# Patient Record
Sex: FEMALE | Race: WHITE | Employment: OTHER | URBAN - METROPOLITAN AREA
[De-identification: names, ages, dates, MRNs, and addresses within clinical notes are randomized per-mention and may not be internally consistent; named-entity substitution may affect disease eponyms.]

---

## 2021-08-11 ENCOUNTER — HOSPITAL ENCOUNTER (INPATIENT)
Age: 61
LOS: 1 days | Discharge: HOME | DRG: 641 | End: 2021-08-13
Attending: EMERGENCY MEDICINE | Admitting: INTERNAL MEDICINE
Payer: COMMERCIAL

## 2021-08-11 ENCOUNTER — APPOINTMENT (OUTPATIENT)
Dept: CT IMAGING | Age: 61
DRG: 641 | End: 2021-08-11
Payer: COMMERCIAL

## 2021-08-11 DIAGNOSIS — E87.1 HYPONATREMIA: Primary | ICD-10-CM

## 2021-08-11 DIAGNOSIS — R51.9 NONINTRACTABLE HEADACHE, UNSPECIFIED CHRONICITY PATTERN, UNSPECIFIED HEADACHE TYPE: ICD-10-CM

## 2021-08-11 LAB
BASOPHILS ABSOLUTE: 0 K/UL (ref 0–0.2)
BASOPHILS RELATIVE PERCENT: 0.5 %
EOSINOPHILS ABSOLUTE: 0 K/UL (ref 0–0.6)
EOSINOPHILS RELATIVE PERCENT: 0.2 %
HCT VFR BLD CALC: 41.9 % (ref 36–48)
HEMOGLOBIN: 14.7 G/DL (ref 12–16)
LYMPHOCYTES ABSOLUTE: 1.2 K/UL (ref 1–5.1)
LYMPHOCYTES RELATIVE PERCENT: 11.7 %
MCH RBC QN AUTO: 32 PG (ref 26–34)
MCHC RBC AUTO-ENTMCNC: 35 G/DL (ref 31–36)
MCV RBC AUTO: 91.3 FL (ref 80–100)
MONOCYTES ABSOLUTE: 0.3 K/UL (ref 0–1.3)
MONOCYTES RELATIVE PERCENT: 3 %
NEUTROPHILS ABSOLUTE: 8.4 K/UL (ref 1.7–7.7)
NEUTROPHILS RELATIVE PERCENT: 84.6 %
PDW BLD-RTO: 13.3 % (ref 12.4–15.4)
PLATELET # BLD: 290 K/UL (ref 135–450)
PMV BLD AUTO: 7.6 FL (ref 5–10.5)
RBC # BLD: 4.59 M/UL (ref 4–5.2)
TROPONIN: <0.01 NG/ML
WBC # BLD: 9.9 K/UL (ref 4–11)

## 2021-08-11 PROCEDURE — 6370000000 HC RX 637 (ALT 250 FOR IP): Performed by: EMERGENCY MEDICINE

## 2021-08-11 PROCEDURE — 36415 COLL VENOUS BLD VENIPUNCTURE: CPT

## 2021-08-11 PROCEDURE — 6360000002 HC RX W HCPCS

## 2021-08-11 PROCEDURE — 84484 ASSAY OF TROPONIN QUANT: CPT

## 2021-08-11 PROCEDURE — 96375 TX/PRO/DX INJ NEW DRUG ADDON: CPT

## 2021-08-11 PROCEDURE — 6360000002 HC RX W HCPCS: Performed by: EMERGENCY MEDICINE

## 2021-08-11 PROCEDURE — 99285 EMERGENCY DEPT VISIT HI MDM: CPT

## 2021-08-11 PROCEDURE — 96374 THER/PROPH/DIAG INJ IV PUSH: CPT

## 2021-08-11 PROCEDURE — 2580000003 HC RX 258: Performed by: EMERGENCY MEDICINE

## 2021-08-11 PROCEDURE — 85025 COMPLETE CBC W/AUTO DIFF WBC: CPT

## 2021-08-11 PROCEDURE — 80053 COMPREHEN METABOLIC PANEL: CPT

## 2021-08-11 PROCEDURE — 83735 ASSAY OF MAGNESIUM: CPT

## 2021-08-11 PROCEDURE — 70450 CT HEAD/BRAIN W/O DYE: CPT

## 2021-08-11 RX ORDER — PROCHLORPERAZINE EDISYLATE 5 MG/ML
10 INJECTION INTRAMUSCULAR; INTRAVENOUS ONCE
Status: COMPLETED | OUTPATIENT
Start: 2021-08-11 | End: 2021-08-11

## 2021-08-11 RX ORDER — 0.9 % SODIUM CHLORIDE 0.9 %
1000 INTRAVENOUS SOLUTION INTRAVENOUS ONCE
Status: COMPLETED | OUTPATIENT
Start: 2021-08-11 | End: 2021-08-12

## 2021-08-11 RX ORDER — ONDANSETRON 2 MG/ML
INJECTION INTRAMUSCULAR; INTRAVENOUS
Status: COMPLETED
Start: 2021-08-11 | End: 2021-08-11

## 2021-08-11 RX ORDER — ACETAMINOPHEN 325 MG/1
650 TABLET ORAL ONCE
Status: COMPLETED | OUTPATIENT
Start: 2021-08-11 | End: 2021-08-11

## 2021-08-11 RX ORDER — DIPHENHYDRAMINE HYDROCHLORIDE 50 MG/ML
25 INJECTION INTRAMUSCULAR; INTRAVENOUS ONCE
Status: COMPLETED | OUTPATIENT
Start: 2021-08-11 | End: 2021-08-11

## 2021-08-11 RX ADMIN — SODIUM CHLORIDE 1000 ML: 9 INJECTION, SOLUTION INTRAVENOUS at 23:29

## 2021-08-11 RX ADMIN — PROCHLORPERAZINE EDISYLATE 10 MG: 5 INJECTION INTRAMUSCULAR; INTRAVENOUS at 23:29

## 2021-08-11 RX ADMIN — DIPHENHYDRAMINE HYDROCHLORIDE 25 MG: 50 INJECTION, SOLUTION INTRAMUSCULAR; INTRAVENOUS at 23:29

## 2021-08-11 RX ADMIN — ACETAMINOPHEN 650 MG: 325 TABLET ORAL at 23:28

## 2021-08-11 RX ADMIN — ONDANSETRON HYDROCHLORIDE 4 MG: 2 INJECTION, SOLUTION INTRAMUSCULAR; INTRAVENOUS at 22:38

## 2021-08-11 ASSESSMENT — PAIN SCALES - GENERAL
PAINLEVEL_OUTOF10: 10
PAINLEVEL_OUTOF10: 10

## 2021-08-11 ASSESSMENT — PAIN DESCRIPTION - DESCRIPTORS: DESCRIPTORS: ACHING

## 2021-08-11 ASSESSMENT — PAIN DESCRIPTION - LOCATION: LOCATION: HEAD

## 2021-08-11 ASSESSMENT — PAIN DESCRIPTION - PAIN TYPE: TYPE: ACUTE PAIN

## 2021-08-11 NOTE — Clinical Note
Patient Class: Inpatient [101]   REQUIRED: Diagnosis: Hyponatremia [836220]   Estimated Length of Stay: Estimated stay of more than 2 midnights   Telemetry/Cardiac Monitoring Required?: Yes

## 2021-08-12 PROBLEM — E87.1 HYPONATREMIA: Status: ACTIVE | Noted: 2021-08-12

## 2021-08-12 LAB
A/G RATIO: 2.2 (ref 1.1–2.2)
ALBUMIN SERPL-MCNC: 4.5 G/DL (ref 3.4–5)
ALBUMIN SERPL-MCNC: 4.7 G/DL (ref 3.4–5)
ALBUMIN SERPL-MCNC: 4.7 G/DL (ref 3.4–5)
ALBUMIN SERPL-MCNC: 4.8 G/DL (ref 3.4–5)
ALBUMIN SERPL-MCNC: 4.9 G/DL (ref 3.4–5)
ALP BLD-CCNC: 93 U/L (ref 40–129)
ALT SERPL-CCNC: 16 U/L (ref 10–40)
ANION GAP SERPL CALCULATED.3IONS-SCNC: 13 MMOL/L (ref 3–16)
ANION GAP SERPL CALCULATED.3IONS-SCNC: 13 MMOL/L (ref 3–16)
ANION GAP SERPL CALCULATED.3IONS-SCNC: 15 MMOL/L (ref 3–16)
ANION GAP SERPL CALCULATED.3IONS-SCNC: 15 MMOL/L (ref 3–16)
ANION GAP SERPL CALCULATED.3IONS-SCNC: 16 MMOL/L (ref 3–16)
AST SERPL-CCNC: 23 U/L (ref 15–37)
BILIRUB SERPL-MCNC: 0.6 MG/DL (ref 0–1)
BILIRUBIN URINE: NEGATIVE
BLOOD, URINE: ABNORMAL
BUN BLDV-MCNC: 3 MG/DL (ref 7–20)
BUN BLDV-MCNC: 4 MG/DL (ref 7–20)
BUN BLDV-MCNC: 4 MG/DL (ref 7–20)
BUN BLDV-MCNC: 5 MG/DL (ref 7–20)
BUN BLDV-MCNC: 5 MG/DL (ref 7–20)
CALCIUM SERPL-MCNC: 8.6 MG/DL (ref 8.3–10.6)
CALCIUM SERPL-MCNC: 8.7 MG/DL (ref 8.3–10.6)
CALCIUM SERPL-MCNC: 8.7 MG/DL (ref 8.3–10.6)
CALCIUM SERPL-MCNC: 9.1 MG/DL (ref 8.3–10.6)
CALCIUM SERPL-MCNC: 9.5 MG/DL (ref 8.3–10.6)
CHLORIDE BLD-SCNC: 84 MMOL/L (ref 99–110)
CHLORIDE BLD-SCNC: 87 MMOL/L (ref 99–110)
CHLORIDE BLD-SCNC: 87 MMOL/L (ref 99–110)
CHLORIDE BLD-SCNC: 88 MMOL/L (ref 99–110)
CHLORIDE BLD-SCNC: 89 MMOL/L (ref 99–110)
CLARITY: CLEAR
CO2: 18 MMOL/L (ref 21–32)
CO2: 19 MMOL/L (ref 21–32)
CO2: 19 MMOL/L (ref 21–32)
CO2: 21 MMOL/L (ref 21–32)
CO2: 21 MMOL/L (ref 21–32)
COLOR: YELLOW
CREAT SERPL-MCNC: <0.5 MG/DL (ref 0.6–1.2)
EKG ATRIAL RATE: 87 BPM
EKG DIAGNOSIS: NORMAL
EKG P AXIS: 70 DEGREES
EKG P-R INTERVAL: 140 MS
EKG Q-T INTERVAL: 408 MS
EKG QRS DURATION: 80 MS
EKG QTC CALCULATION (BAZETT): 490 MS
EKG R AXIS: 66 DEGREES
EKG T AXIS: 80 DEGREES
EKG VENTRICULAR RATE: 87 BPM
EPITHELIAL CELLS, UA: ABNORMAL /HPF (ref 0–5)
ESTIMATED AVERAGE GLUCOSE: 108.3 MG/DL
GFR AFRICAN AMERICAN: >60
GFR NON-AFRICAN AMERICAN: >60
GLOBULIN: 2.2 G/DL
GLUCOSE BLD-MCNC: 131 MG/DL (ref 70–99)
GLUCOSE BLD-MCNC: 133 MG/DL (ref 70–99)
GLUCOSE BLD-MCNC: 139 MG/DL (ref 70–99)
GLUCOSE BLD-MCNC: 143 MG/DL (ref 70–99)
GLUCOSE BLD-MCNC: 153 MG/DL (ref 70–99)
GLUCOSE URINE: 250 MG/DL
HBA1C MFR BLD: 5.4 %
KETONES, URINE: ABNORMAL MG/DL
LEUKOCYTE ESTERASE, URINE: NEGATIVE
MAGNESIUM: 1.7 MG/DL (ref 1.8–2.4)
MICROSCOPIC EXAMINATION: YES
NITRITE, URINE: NEGATIVE
OSMOLALITY URINE: 380 MOSM/KG (ref 390–1070)
PH UA: 7.5 (ref 5–8)
PHOSPHORUS: 2.6 MG/DL (ref 2.5–4.9)
PHOSPHORUS: 2.7 MG/DL (ref 2.5–4.9)
PHOSPHORUS: 2.7 MG/DL (ref 2.5–4.9)
PHOSPHORUS: 2.8 MG/DL (ref 2.5–4.9)
POTASSIUM REFLEX MAGNESIUM: 3.4 MMOL/L (ref 3.5–5.1)
POTASSIUM SERPL-SCNC: 3.4 MMOL/L (ref 3.5–5.1)
POTASSIUM SERPL-SCNC: 3.6 MMOL/L (ref 3.5–5.1)
POTASSIUM SERPL-SCNC: 3.6 MMOL/L (ref 3.5–5.1)
POTASSIUM SERPL-SCNC: 3.7 MMOL/L (ref 3.5–5.1)
PROTEIN UA: NEGATIVE MG/DL
RBC UA: ABNORMAL /HPF (ref 0–4)
SODIUM BLD-SCNC: 120 MMOL/L (ref 136–145)
SODIUM BLD-SCNC: 121 MMOL/L (ref 136–145)
SODIUM BLD-SCNC: 122 MMOL/L (ref 136–145)
SODIUM URINE: 132 MMOL/L
SPECIFIC GRAVITY UA: 1.01 (ref 1–1.03)
TOTAL PROTEIN: 7.1 G/DL (ref 6.4–8.2)
URINE TYPE: ABNORMAL
UROBILINOGEN, URINE: 0.2 E.U./DL
WBC UA: ABNORMAL /HPF (ref 0–5)

## 2021-08-12 PROCEDURE — 2580000003 HC RX 258: Performed by: INTERNAL MEDICINE

## 2021-08-12 PROCEDURE — 6370000000 HC RX 637 (ALT 250 FOR IP): Performed by: NURSE PRACTITIONER

## 2021-08-12 PROCEDURE — 36415 COLL VENOUS BLD VENIPUNCTURE: CPT

## 2021-08-12 PROCEDURE — 84300 ASSAY OF URINE SODIUM: CPT

## 2021-08-12 PROCEDURE — 93005 ELECTROCARDIOGRAM TRACING: CPT | Performed by: EMERGENCY MEDICINE

## 2021-08-12 PROCEDURE — 80069 RENAL FUNCTION PANEL: CPT

## 2021-08-12 PROCEDURE — 6370000000 HC RX 637 (ALT 250 FOR IP): Performed by: INTERNAL MEDICINE

## 2021-08-12 PROCEDURE — 81001 URINALYSIS AUTO W/SCOPE: CPT

## 2021-08-12 PROCEDURE — 83036 HEMOGLOBIN GLYCOSYLATED A1C: CPT

## 2021-08-12 PROCEDURE — 6360000002 HC RX W HCPCS: Performed by: EMERGENCY MEDICINE

## 2021-08-12 PROCEDURE — 2060000000 HC ICU INTERMEDIATE R&B

## 2021-08-12 PROCEDURE — 6360000002 HC RX W HCPCS: Performed by: INTERNAL MEDICINE

## 2021-08-12 PROCEDURE — 83935 ASSAY OF URINE OSMOLALITY: CPT

## 2021-08-12 PROCEDURE — 93010 ELECTROCARDIOGRAM REPORT: CPT | Performed by: INTERNAL MEDICINE

## 2021-08-12 RX ORDER — POTASSIUM CHLORIDE 20 MEQ/1
20 TABLET, EXTENDED RELEASE ORAL ONCE
Status: COMPLETED | OUTPATIENT
Start: 2021-08-12 | End: 2021-08-12

## 2021-08-12 RX ORDER — HYDROCODONE BITARTRATE AND ACETAMINOPHEN 5; 325 MG/1; MG/1
1 TABLET ORAL ONCE
Status: COMPLETED | OUTPATIENT
Start: 2021-08-12 | End: 2021-08-12

## 2021-08-12 RX ORDER — CALCIUM CARBONATE 200(500)MG
500 TABLET,CHEWABLE ORAL 3 TIMES DAILY PRN
Status: DISCONTINUED | OUTPATIENT
Start: 2021-08-12 | End: 2021-08-13 | Stop reason: HOSPADM

## 2021-08-12 RX ORDER — SODIUM CHLORIDE 0.9 % (FLUSH) 0.9 %
5-40 SYRINGE (ML) INJECTION EVERY 12 HOURS SCHEDULED
Status: DISCONTINUED | OUTPATIENT
Start: 2021-08-12 | End: 2021-08-13 | Stop reason: HOSPADM

## 2021-08-12 RX ORDER — ACETAMINOPHEN 650 MG/1
650 SUPPOSITORY RECTAL EVERY 6 HOURS PRN
Status: DISCONTINUED | OUTPATIENT
Start: 2021-08-12 | End: 2021-08-13 | Stop reason: HOSPADM

## 2021-08-12 RX ORDER — SODIUM CHLORIDE 0.9 % (FLUSH) 0.9 %
5-40 SYRINGE (ML) INJECTION PRN
Status: DISCONTINUED | OUTPATIENT
Start: 2021-08-12 | End: 2021-08-13 | Stop reason: HOSPADM

## 2021-08-12 RX ORDER — ONDANSETRON 4 MG/1
4 TABLET, ORALLY DISINTEGRATING ORAL EVERY 8 HOURS PRN
Status: DISCONTINUED | OUTPATIENT
Start: 2021-08-12 | End: 2021-08-13 | Stop reason: HOSPADM

## 2021-08-12 RX ORDER — SODIUM CHLORIDE 9 MG/ML
25 INJECTION, SOLUTION INTRAVENOUS PRN
Status: DISCONTINUED | OUTPATIENT
Start: 2021-08-12 | End: 2021-08-13 | Stop reason: HOSPADM

## 2021-08-12 RX ORDER — MAGNESIUM SULFATE 1 G/100ML
1000 INJECTION INTRAVENOUS ONCE
Status: COMPLETED | OUTPATIENT
Start: 2021-08-12 | End: 2021-08-12

## 2021-08-12 RX ORDER — PROMETHAZINE HYDROCHLORIDE 25 MG/ML
6.25 INJECTION, SOLUTION INTRAMUSCULAR; INTRAVENOUS ONCE
Status: COMPLETED | OUTPATIENT
Start: 2021-08-12 | End: 2021-08-12

## 2021-08-12 RX ORDER — ONDANSETRON 2 MG/ML
4 INJECTION INTRAMUSCULAR; INTRAVENOUS EVERY 6 HOURS PRN
Status: DISCONTINUED | OUTPATIENT
Start: 2021-08-12 | End: 2021-08-13 | Stop reason: HOSPADM

## 2021-08-12 RX ORDER — HYDRALAZINE HYDROCHLORIDE 20 MG/ML
20 INJECTION INTRAMUSCULAR; INTRAVENOUS EVERY 6 HOURS PRN
Status: DISCONTINUED | OUTPATIENT
Start: 2021-08-12 | End: 2021-08-13 | Stop reason: HOSPADM

## 2021-08-12 RX ORDER — AMLODIPINE BESYLATE 5 MG/1
5 TABLET ORAL DAILY
Status: DISCONTINUED | OUTPATIENT
Start: 2021-08-12 | End: 2021-08-13 | Stop reason: HOSPADM

## 2021-08-12 RX ORDER — SODIUM CHLORIDE 9 MG/ML
INJECTION, SOLUTION INTRAVENOUS CONTINUOUS
Status: DISCONTINUED | OUTPATIENT
Start: 2021-08-12 | End: 2021-08-13

## 2021-08-12 RX ORDER — ACETAMINOPHEN 325 MG/1
650 TABLET ORAL EVERY 6 HOURS PRN
Status: DISCONTINUED | OUTPATIENT
Start: 2021-08-12 | End: 2021-08-13 | Stop reason: HOSPADM

## 2021-08-12 RX ADMIN — SODIUM CHLORIDE: 9 INJECTION, SOLUTION INTRAVENOUS at 18:19

## 2021-08-12 RX ADMIN — ONDANSETRON 4 MG: 2 INJECTION INTRAMUSCULAR; INTRAVENOUS at 15:51

## 2021-08-12 RX ADMIN — AMLODIPINE BESYLATE 5 MG: 5 TABLET ORAL at 08:09

## 2021-08-12 RX ADMIN — CALCIUM CARBONATE (ANTACID) CHEW TAB 500 MG 500 MG: 500 CHEW TAB at 20:50

## 2021-08-12 RX ADMIN — PROMETHAZINE HYDROCHLORIDE 6.25 MG: 25 INJECTION INTRAMUSCULAR; INTRAVENOUS at 08:32

## 2021-08-12 RX ADMIN — CALCIUM CARBONATE (ANTACID) CHEW TAB 500 MG 500 MG: 500 CHEW TAB at 13:28

## 2021-08-12 RX ADMIN — ACETAMINOPHEN 650 MG: 325 TABLET ORAL at 05:23

## 2021-08-12 RX ADMIN — POTASSIUM CHLORIDE 20 MEQ: 1500 TABLET, EXTENDED RELEASE ORAL at 18:12

## 2021-08-12 RX ADMIN — SODIUM CHLORIDE: 9 INJECTION, SOLUTION INTRAVENOUS at 04:35

## 2021-08-12 RX ADMIN — Medication 10 ML: at 08:10

## 2021-08-12 RX ADMIN — MAGNESIUM SULFATE HEPTAHYDRATE 1000 MG: 1 INJECTION, SOLUTION INTRAVENOUS at 02:08

## 2021-08-12 RX ADMIN — HYDRALAZINE HYDROCHLORIDE 20 MG: 20 INJECTION INTRAMUSCULAR; INTRAVENOUS at 05:23

## 2021-08-12 RX ADMIN — ONDANSETRON 4 MG: 2 INJECTION INTRAMUSCULAR; INTRAVENOUS at 05:41

## 2021-08-12 RX ADMIN — HYDROCODONE BITARTRATE AND ACETAMINOPHEN 1 TABLET: 5; 325 TABLET ORAL at 14:38

## 2021-08-12 RX ADMIN — ENOXAPARIN SODIUM 40 MG: 40 INJECTION SUBCUTANEOUS at 08:09

## 2021-08-12 ASSESSMENT — PAIN SCALES - GENERAL
PAINLEVEL_OUTOF10: 6
PAINLEVEL_OUTOF10: 3
PAINLEVEL_OUTOF10: 0
PAINLEVEL_OUTOF10: 8

## 2021-08-12 ASSESSMENT — PAIN DESCRIPTION - PROGRESSION: CLINICAL_PROGRESSION: NOT CHANGED

## 2021-08-12 ASSESSMENT — PAIN DESCRIPTION - LOCATION: LOCATION: HEAD

## 2021-08-12 ASSESSMENT — PAIN - FUNCTIONAL ASSESSMENT: PAIN_FUNCTIONAL_ASSESSMENT: ACTIVITIES ARE NOT PREVENTED

## 2021-08-12 ASSESSMENT — PAIN DESCRIPTION - DESCRIPTORS: DESCRIPTORS: HEADACHE

## 2021-08-12 ASSESSMENT — PAIN DESCRIPTION - ONSET: ONSET: ON-GOING

## 2021-08-12 ASSESSMENT — PAIN DESCRIPTION - FREQUENCY: FREQUENCY: CONTINUOUS

## 2021-08-12 ASSESSMENT — PAIN DESCRIPTION - PAIN TYPE: TYPE: ACUTE PAIN

## 2021-08-12 NOTE — ED NOTES
Pt given migraine cocktail at this time. Also went over current test results with pt.      Milan Pacheco RN  08/11/21 8304

## 2021-08-12 NOTE — ED NOTES
Pt given couple of crackers upon request and verbal permission from Dr. Prosper Girard.      Danilo Botello, SACHIN  08/12/21 9862

## 2021-08-12 NOTE — ED NOTES
Pt ambulated to bathroom at this time with steady gait. Magnesium still infusing.      Nicko Petersen RN  08/12/21 2792

## 2021-08-12 NOTE — PROGRESS NOTES
Patient seen by terriist on 8/12. H&p Reviewed    57year old female presented with c/o headache, fatigue, and lethargy. Admitted for hyponatremia. Assessment/Plan    Hyponatremia  - unclear if this is decreased intake vs SIADH  - Na 120-->121  - Nephrology consult  - IVF's. Osmolality 380.  - seizure precautions. Hypokalemia  - replaced. Improved. Elevated BP without diagnosis of hypertension  - started on Norvasc. BP improving.  - Hydralazine prn  - Follow closely with PCP. Anxiety  - on Klonopin at home     Headaches  - possibly due to uncontrolled hypertension, hyponatremia.     Nausea/vomiting  - Zofran  - one time dose of Phenergan    Lovenox for DVT prophylaxis  Full Code  Regular diet    King DUNN-C  8/12/2021

## 2021-08-12 NOTE — CARE COORDINATION
CM reviewed chart. [facility] contact information added to DC paperwork. Will not follow with pt. Please advise if discharge needs identified.

## 2021-08-12 NOTE — H&P
Hospital Medicine History & Physical      PCP: No primary care provider on file. Date of Admission: 8/11/2021    Date of Service: Pt seen/examined on 8/12/2021    Chief Complaint: Headaches and heat exhaustion    History Of Present Illness:    57 y.o. female who presented to the hospital with a chief complaint of headaches and increased fatigue and lethargy. The patient states that she mowed her yard for about 3 to 4 hours today and a temperature as well over 90 degrees. After that she felt really ill, endorsed nausea and headaches. She thinks she may have overdone it. She went in and drank a whole lot of water. She was not able to quantify exactly how much she drank but says it was a lot. Despite all this she continued to feel poorly. With the worsening headaches and sick feeling she decided to come to the emergency department to get evaluated. In the ER here she was found to have a serum sodium level of 120. She was transferred for further medical evaluation and treatment. Past Medical History:      History reviewed. No pertinent past medical history. Past Surgical History:          Procedure Laterality Date    TONSILLECTOMY         Medications Prior to Admission:      Prior to Admission medications    Medication Sig Start Date End Date Taking?  Authorizing Provider   vitamin C (ASCORBIC ACID) 500 MG tablet Take 500 mg by mouth daily   Yes Historical Provider, MD   vitamin D (CHOLECALCIFEROL) 1000 UNIT TABS tablet Take 1,000 Units by mouth daily   Yes Historical Provider, MD   acyclovir (ZOVIRAX) 200 MG capsule Take by mouth every 4 hours (while awake)   Yes Historical Provider, MD   loratadine (CLARITIN) 10 MG capsule Take 10 mg by mouth daily   Yes Historical Provider, MD   azithromycin (ZITHROMAX) 250 MG tablet Take 250 mg by mouth daily    Historical Provider, MD   Cranberry 500 MG CAPS Take 500 mg by mouth 2 times daily    Historical Provider, MD   butalbital-APAP-caffeine (FIORICET) -40 MG CAPS per capsule Take 1 capsule by mouth every 4 hours as needed for Headaches 2/8/18 2/11/18  Rahel Reina MD   ondansetron [facility]) 4 MG tablet Take 1 tablet by mouth every 8 hours as needed for Nausea 2/8/18   Rahel Reina MD       Allergies:  Prednisone and Sulfa antibiotics    Social History:      TOBACCO:   reports that she has never smoked. She has never used smokeless tobacco.  ETOH:   reports no history of alcohol use. Family History:    History reviewed. No pertinent family history. REVIEW OF SYSTEMS:   Constitutional:   Generalized fatigue  ENT:  Negative for rhinorrhea, epistaxis, hoarseness, sore throat. Respiratory: Negative for SOB or wheezing   Cardiovascular:   Negative for  chest pain, palpitations   Gastrointestinal:  Negative for nausea, vomiting, diarrhea  Genitourinary:  Negative for polyuria, dysuria   Hematologic/Lymphatic:  Negative for  bleeding tendency, easy bruising  Musculoskeletal:  Negative for myalgias and arthralgias  Neurologic:   Positive for headaches  Skin:  Negative for itching,rash  Psychiatric:  Negative for depression,anxiety, agitation. Endocrine:  Negative for polydipsia,polyuria,heat /cold intolerance. PHYSICAL EXAM:    BP (!) 182/96   Pulse 92   Temp 97.1 °F (36.2 °C) (Oral)   Resp 18   Ht 5' (1.524 m)   Wt 128 lb 8 oz (58.3 kg)   SpO2 98%   BMI 25.10 kg/m²     General appearance:  No apparent distress, appears stated age and cooperative. HEENT:  Normal cephalic, atraumatic without obvious deformity. Pupils equal, round, and reactive to light. Extra ocular muscles intact. Conjunctivae/corneas clear. Neck: Supple, with full range of motion. No jugular venous distention. Trachea midline. Respiratory:  Normal respiratory effort. Clear to auscultation, bilaterally without Rales/Wheezes/Rhonchi. Cardiovascular:  Regular rate and rhythm with normal S1/S2 without murmurs, rubs or gallops.   Abdomen: Soft, non-tender, non-distended with normal bowel sounds. Musculoskeletal:  No clubbing, cyanosis or edema bilaterally. Full range of motion without deformity. Skin: Skin color, texture, turgor normal.  No rashes or lesions. Neurologic:  Neurovascularly intact without any focal sensory/motor deficits. Cranial nerves: II-XII intact, grossly non-focal.  Psychiatric:  Alert and oriented, thought content appropriate, normal insight  Capillary Refill: Brisk,< 3 seconds   Peripheral Pulses: +2 palpable, equal bilaterally       Labs:   Recent Labs     08/11/21 2231   WBC 9.9   HGB 14.7   HCT 41.9        Recent Labs     08/11/21 2231 08/12/21  0508   * 121*   K 3.4* 3.6   CL 84* 89*   CO2 21 19*   BUN 4* 3*   CREATININE <0.5* <0.5*   CALCIUM 9.5 8.6   PHOS  --  2.7     Recent Labs     08/11/21 2231   AST 23   ALT 16   BILITOT 0.6   ALKPHOS 93     No results for input(s): INR in the last 72 hours. Recent Labs     08/11/21 2231   TROPONINI <0.01       Urinalysis:      Lab Results   Component Value Date    NITRU Negative 08/12/2021    WBCUA None seen 08/12/2021    RBCUA 0-2 08/12/2021    BLOODU TRACE-INTACT 08/12/2021    SPECGRAV 1.015 08/12/2021    GLUCOSEU 250 08/12/2021       Radiology:   CT Head WO Contrast   Final Result   1. No acute intracranial abnormality. 2. Air-fluid level in the right maxillary sinus. Correlate with any symptoms   of sinusitis. ASSESSMENT:  Hyponatremia  Hypokalemia  Hypertension, undiagnosed  Anxiety disorder  Headaches    PLAN:  Euvolemic hyponatremia, unclear if this is clearly decreased solute intake versus SIADH. Will obtain urine studies, consult nephrology. We will start gentle IV saline at 75 cc an hour. Repeat renal panel every 4 hours and adjust rates if needed. May also need to fluid restrict with the questionable polydipsia.     -Start Norvasc for hypertension  -Replace potassium, check A1C as patient has glucosuria   -Resume daily acyclovir and clonazepam as needed once dosing is reconciled.  -Seizure precautions      DVT Prophylaxis: Lovenox  Diet: ADULT DIET; Regular  Code Status: Full Code    Dispo -admit to [facility]      Thank you for the opportunity to be involved in this patient's care.       (Please note that portions of this note were completed with a voice recognition program. Efforts were made to edit the dictations but occasionally words are mis-transcribed.)

## 2021-08-12 NOTE — ED PROVIDER NOTES
[facility] [facility] [employer]      Pt Name: Leatha Hagen  MRN: [id number]  [place] 1960  Date of evaluation: 8/11/2021  Provider: Beto Hogan MD    CHIEF COMPLAINT       Chief Complaint   Patient presents with    Hypertension     Pt ambulates into ED with complaints of mowing for 3-4 hours today and hasn't felt right ever since. States migraine and vomited once since coming. States episode of diarrhea. HISTORY OF PRESENT ILLNESS   (Location/Symptom, Timing/Onset, Context/Setting, Quality, Duration, Modifying Factors, Severity)  Note limiting factors. Leatha Hagen is a 57 y.o. female with past medical history of reported untreated hypertension here with headache    The patient states that since earlier today she is not been feeling well. She notes that for 3 to 4 hours she was mowing her lawn outside with temperatures well over 90 degrees. She states \"I think I overdid it\". She notes she went inside and was feeling a mild throbbing headache over the crown of her head associated with generalized weakness and lethargy. States she is had tremors. Mild nausea with one episode of emesis. She states she previous had some diarrhea that is since resolved. Denies any abdominal pain. No chest pain or shortness of breath. She feels that she was outside in the heat for too long. She does note that after being outside she came in and drink water because she thought she might be dehydrated. [facility]    Nursing Notes were reviewed. REVIEW OF SYSTEMS    (2-9 systems for level 4, 10 or more for level 5)     Review of Systems    Please see HPI for pertinent positive and negative review of system findings. A full 10 system ROS was performed and otherwise negative. PAST MEDICAL HISTORY   History reviewed. No pertinent past medical history.       SURGICAL HISTORY       Past Surgical History:   Procedure Laterality Date    TONSILLECTOMY           CURRENT MEDICATIONS Previous Medications    ACYCLOVIR (ZOVIRAX) 200 MG CAPSULE    Take by mouth every 4 hours (while awake)    AZITHROMYCIN (ZITHROMAX) 250 MG TABLET    Take 250 mg by mouth daily    BUTALBITAL-APAP-CAFFEINE (FIORICET) -40 MG CAPS PER CAPSULE    Take 1 capsule by mouth every 4 hours as needed for Headaches    CRANBERRY 500 MG CAPS    Take 500 mg by mouth 2 times daily    LORATADINE (CLARITIN) 10 MG CAPSULE    Take 10 mg by mouth daily    ONDANSETRON (ZOFRAN) 4 MG TABLET    Take 1 tablet by mouth every 8 hours as needed for Nausea    VITAMIN C (ASCORBIC ACID) 500 MG TABLET    Take 500 mg by mouth daily    VITAMIN D (CHOLECALCIFEROL) 1000 UNIT TABS TABLET    Take 1,000 Units by mouth daily       ALLERGIES     Prednisone and Sulfa antibiotics    FAMILY HISTORY     History reviewed. No pertinent family history. SOCIAL HISTORY       Social History     Socioeconomic History    Marital status:      Spouse name: None    Number of children: None    Years of education: None    Highest education level: None   Occupational History    None   Tobacco Use    Smoking status: Never Smoker    Smokeless tobacco: Never Used   Vaping Use    Vaping Use: Never used   Substance and Sexual Activity    Alcohol use: No    Drug use: No    Sexual activity: Not Currently     Partners: Male   Other Topics Concern    None   Social History Narrative    None     Social Determinants of Health     Financial Resource Strain:     Difficulty of Paying Living Expenses:    Food Insecurity:     Worried About Running Out of Food in the Last Year:     Ran Out of Food in the Last Year:    Transportation Needs:     Lack of Transportation (Medical):      Lack of Transportation (Non-Medical):    Physical Activity:     Days of Exercise per Week:     Minutes of Exercise per Session:    Stress:     Feeling of Stress :    Social Connections:     Frequency of Communication with Friends and Family:     Frequency of Social Gatherings with Friends and Family:     Attends Alevism Services:     Active Member of Clubs or Organizations:     Attends Club or Organization Meetings:     Marital Status:    Intimate Partner Violence:     Fear of Current or Ex-Partner:     Emotionally Abused:     Physically Abused:     Sexually Abused:        SCREENINGS    [place] Coma Scale  Eye Opening: Spontaneous  Best Verbal Response: Oriented  Best Motor Response: Obeys commands  [place] Coma Scale Score: 15          PHYSICAL EXAM    (up to 7 for level 4, 8 or more for level 5)     ED Triage Vitals [08/11/21 2219]   BP Temp Temp Source Pulse Resp SpO2 Height Weight   (!) 198/94 98 °F (36.7 °C) Oral 88 18 100 % 5' (1.524 m) 130 lb (59 kg)       Physical Exam    General appearance:  Cooperative. No acute distress. Skin:  Warm. Dry. Eye:  Extraocular movements intact. Pupils are equally round and reactive to light and accommodation. Extraocular motions are intact. CN II-XII intact. Ears, nose, mouth and throat:  Oral mucosa moist,  Neck:  Trachea midline. Heart:  Regular rate and rhythm  Perfusion:  intact  Respiratory:  Lungs clear to auscultation bilaterally. Respirations nonlabored. Abdominal:   Non distended. Nontender  Neurological:  Alert and oriented x 3. Moves all extremities spontaneously. Intact sensation throughout. Intact finger-to-nose bilaterally. No drift in the upper or lower extremities. Gait normal.  2+ bilateral patellar reflexes  Musculoskeletal:   Normal ROM, no deformities          Psychiatric:  Normal mood      DIAGNOSTIC RESULTS       Labs Reviewed   CBC WITH AUTO DIFFERENTIAL - Abnormal; Notable for the following components:       Result Value    Neutrophils Absolute 8.4 (*)     All other components within normal limits    Narrative:     Performed at:  [facility]. [facility]  [address] [place] [address].  [place], [address] [facility]   Phone [phone] W/ REFLEX TO MG FOR LOW K - Abnormal; Notable for the following components:    Sodium 120 (*)     Potassium reflex Magnesium 3.4 (*)     Chloride 84 (*)     Glucose 133 (*)     BUN 4 (*)     CREATININE <0.5 (*)     All other components within normal limits    Narrative:     Performed at:  [facility]. [facility]  [facility],  [place] [address]. [facility], [email or web address]   Phone [phone]   MAGNESIUM - Abnormal; Notable for the following components:    Magnesium 1.70 (*)     All other components within normal limits    Narrative:     Performed at:  [facility]. [facility]  [facility] [address] [facility], [email or web address]   Phone [phone]   TROPONIN    Narrative:     Performed at:  [facility]. [facility]  [facility] [place] [id number]. [facility], [email or web address]   Phone [phone]       Interpretation per the Radiologist below, if obtained/available at the time of this note:    CT Head WO Contrast   Final Result   1. No acute intracranial abnormality. 2. Air-fluid level in the right maxillary sinus. Correlate with any symptoms   of sinusitis. All other labs/imaging were within normal range or not returned as of this dictation. EMERGENCY DEPARTMENT COURSE and DIFFERENTIAL DIAGNOSIS/MDM:   Vitals:    Vitals:    08/11/21 2219   BP: (!) 198/94   Pulse: 88   Resp: 18   Temp: 98 °F (36.7 °C)   TempSrc: Oral   SpO2: 100%   Weight: 130 lb (59 kg)   Height: 5' (1.524 m)       EKG: Sinus rhythm rate of 87 bpm.  Biatrial enlargement. Nonspecific ST changes anteriorly and laterally. No ST elevation. No prior. Prolonged QTC of 490 ms. Patient presents the emergency department today complaining of headache, lethargy nausea and vomiting. Found to be hypertensive here. She states she has been diagnosed with hypertension but takes no medications because normally her blood pressure is within normal limits.   She had a nonfocal neurologic exam.  Given her age and complaints a head CT was performed which was negative. Laboratory studies were notable for a sodium of 120. No history of alcoholism. No history of hyponatremia. She is not on any diuretics. Patient states she was outside working in the heat for quite some time this may be due to dehydration, however she also states she came inside and drank a significant amount of water. Polydipsia is also a potential underlying etiology. She received 500 mL of sodium chloride here before being changed to a rate of 200 mL an hour to complete that first liter so as to not correct her sodium too rapidly. MDM    CONSULTS     IP CONSULT TO HOSPITALIST  IP CONSULT TO NEPHROLOGY    Critical Care:     CRITICAL CARE TIME   Total Critical Care time was 30 minutes, excluding separately reportable procedures. There was a high probability of clinically significant/life threatening deterioration in the patient's condition which required my urgent intervention. This time was spent reviewing the patient chart, interpreting diagnostic/laboratory data, administration of IV fluids for moderate to severe hyponatremia      REASSESSMENT          PROCEDURE     Unless otherwise noted below, none     Procedures      FINAL IMPRESSION      1. Hyponatremia    2. Nonintractable headache, unspecified chronicity pattern, unspecified headache type            DISPOSITION/PLAN   DISPOSITION Admitted 08/12/2021 12:47:51 AM        PATIENT REFERRED TO:  No follow-up provider specified. DISCHARGE MEDICATIONS:  New Prescriptions    No medications on file     Controlled Substances Monitoring:     RX Monitoring 2/8/2018   Attestation The Prescription Monitoring Report for this patient was reviewed today. Periodic Controlled Substance Monitoring No signs of potential drug abuse or diversion identified.        (Please note that portions of this note were completed with a voice recognition program.  Efforts were made to edit the dictations but occasionally words are mis-transcribed.)    Kari Roque MD (electronically signed)  Attending Emergency Physician            Juana Huddleston MD  08/12/21 [id number]

## 2021-08-12 NOTE — ED NOTES
Pt back from bathroom at this time. Pt placed back on monitor and magnesium at this time.      Enriqueta Clemens RN  08/12/21 3906

## 2021-08-12 NOTE — PROGRESS NOTES
Patient continues to have nausea and vomiting despite administration of IV zofran. Spoke with Dr. Emily Stewart, new order for 1x dose of IM phenergan.

## 2021-08-12 NOTE — CONSULTS
[facility] Consult Note           Reason for Consult:  Hyponatremia  Requesting Physician:  Dr. Regina Fleming    Chief Complaint:  Headaches, heat exhaustion  History Obtained From:  patient, electronic medical record    History of Present Ilness:    57year old female with no significant pmh admitted with headaches, heat exhaustion. We have been asked to assist in further mgmt of her Hyponatremia. SNa ~120 on admission, no priors for comparison.  glucose, trace ketones, trace blood, negative protein. Urine osmolality 132, urine osmolality 380. Has been working outside in [address] [place] [address] her lawn for most of yesterday. Has been trying to drink fluids, primarily water. Intake reduced. Has been having headaches and generalized weakness. Denies any shortness of breath, abdominal pain, or fevers. Past Medical History:    History reviewed. No pertinent past medical history. Past Surgical History:        Procedure Laterality Date    TONSILLECTOMY         Home Medications:    No current facility-administered medications on file prior to encounter.      Current Outpatient Medications on File Prior to Encounter   Medication Sig Dispense Refill    vitamin C (ASCORBIC ACID) 500 MG tablet Take 500 mg by mouth daily      vitamin D (CHOLECALCIFEROL) 1000 UNIT TABS tablet Take 1,000 Units by mouth daily      acyclovir (ZOVIRAX) 200 MG capsule Take by mouth every 4 hours (while awake)      loratadine (CLARITIN) 10 MG capsule Take 10 mg by mouth daily      azithromycin (ZITHROMAX) 250 MG tablet Take 250 mg by mouth daily      Cranberry 500 MG CAPS Take 500 mg by mouth 2 times daily      butalbital-APAP-caffeine (FIORICET) -40 MG CAPS per capsule Take 1 capsule by mouth every 4 hours as needed for Headaches 18 capsule 0    ondansetron (ZOFRAN) 4 MG tablet Take 1 tablet by mouth every 8 hours as needed for Nausea 20 tablet 0       Allergies:  Prednisone and Sulfa antibiotics    Social History:    Social History     Socioeconomic History    Marital status:      Spouse name: Not on file    Number of children: Not on file    Years of education: Not on file    Highest education level: Not on file   Occupational History    Not on file   Tobacco Use    Smoking status: Never Smoker    Smokeless tobacco: Never Used   Vaping Use    Vaping Use: Never used   Substance and Sexual Activity    Alcohol use: No    Drug use: No    Sexual activity: Not Currently     Partners: Male   Other Topics Concern    Not on file   Social History Narrative    Not on file     Social Determinants of Health     Financial Resource Strain:     Difficulty of Paying Living Expenses:    Food Insecurity:     Worried About Running Out of Food in the Last Year:     [address] in the Last Year:    Transportation Needs:     Lack of Transportation (Medical):  Lack of Transportation (Non-Medical):    Physical Activity:     Days of Exercise per Week:     Minutes of Exercise per Session:    Stress:     Feeling of Stress :    Social Connections:     Frequency of Communication with Friends and Family:     Frequency of Social Gatherings with Friends and Family:     Attends Bahai Services:     Active Member of Clubs or Organizations:     Attends Club or Organization Meetings:     Marital Status:    Intimate Partner Violence:     Fear of Current or Ex-Partner:     Emotionally Abused:     Physically Abused:     Sexually Abused:        Family History:   History reviewed. No pertinent family history. Review of Systems:   Pertinent positives stated above in HPI. Remainder of 10 point review of systems were reviewed and were negative.     Physical exam:   Constitutional:  VITALS:  BP (!) 151/77   Pulse 102   Temp 97.4 °F (36.3 °C) (Oral)   Resp 18   Ht 5' (1.524 m)   Wt 128 lb 8 oz (58.3 kg)   SpO2 96%   BMI 25.10 kg/m²   Gen: alert, awake, nad  Skin: no rash, turgor wnl  Heent:  eomi, mm dry  Neck: no bruits or jvd noted, thyroid normal  Cardiovascular:  S1, S2 without m/r/g  Respiratory: CTA B without w/r/r; respiratory effort normal  Abdomen:  +bs, soft, nt, nd, no hepatosplenomegaly  Ext: no lower extremity edema  Psychiatric: mood and affect appropriate; judgement and insight intact  Musculoskeletal:  Rom, muscular strength limited; digits, nails normal    Data/  CBC:   Lab Results   Component Value Date    WBC 9.9 08/11/2021    RBC 4.59 08/11/2021    HGB 14.7 08/11/2021    HCT 41.9 08/11/2021    MCV 91.3 08/11/2021    MCH 32.0 08/11/2021    MCHC 35.0 08/11/2021    RDW 13.3 08/11/2021     08/11/2021    MPV 7.6 08/11/2021     BMP:    Lab Results   Component Value Date     08/12/2021    K 3.6 08/12/2021    K 3.4 08/11/2021    CL 89 08/12/2021    CO2 19 08/12/2021    BUN 3 08/12/2021    LABALBU 4.8 08/12/2021    CREATININE <0.5 08/12/2021    CALCIUM 8.6 08/12/2021    GFRAA >60 08/12/2021    LABGLOM >60 08/12/2021    GLUCOSE 143 08/12/2021         Assessment/    - Hyponatremia - hypovolemic from heat exhaustion    - Hypokalemia - prn replacement    - Hypertension - started on norvasc      Plan/    - IVF trial with NS 75 ml/hour  - Trend labs [id number], bp's, & urine output   - Check Hba1c  - Obtain prior lab records from PCP for comparison      Thank you for the consultation. Please do not hesitate to call with questions. ____________________________________  Denise Carney MD  [facility]  [email or web address]  Office: [phone]

## 2021-08-13 VITALS
WEIGHT: 127.4 LBS | HEIGHT: 60 IN | SYSTOLIC BLOOD PRESSURE: 146 MMHG | BODY MASS INDEX: 25.01 KG/M2 | HEART RATE: 92 BPM | OXYGEN SATURATION: 97 % | RESPIRATION RATE: 16 BRPM | DIASTOLIC BLOOD PRESSURE: 81 MMHG | TEMPERATURE: 98.3 F

## 2021-08-13 PROBLEM — I10 ESSENTIAL HYPERTENSION: Status: ACTIVE | Noted: 2021-08-13

## 2021-08-13 PROBLEM — T67.5XXA HEAT EXHAUSTION: Status: ACTIVE | Noted: 2021-08-13

## 2021-08-13 PROBLEM — K21.9 GERD (GASTROESOPHAGEAL REFLUX DISEASE): Status: ACTIVE | Noted: 2021-08-13

## 2021-08-13 LAB
ALBUMIN SERPL-MCNC: 4.4 G/DL (ref 3.4–5)
ALBUMIN SERPL-MCNC: 4.5 G/DL (ref 3.4–5)
ANION GAP SERPL CALCULATED.3IONS-SCNC: 13 MMOL/L (ref 3–16)
ANION GAP SERPL CALCULATED.3IONS-SCNC: 14 MMOL/L (ref 3–16)
BUN BLDV-MCNC: 4 MG/DL (ref 7–20)
BUN BLDV-MCNC: 4 MG/DL (ref 7–20)
CALCIUM SERPL-MCNC: 8.7 MG/DL (ref 8.3–10.6)
CALCIUM SERPL-MCNC: 8.9 MG/DL (ref 8.3–10.6)
CHLORIDE BLD-SCNC: 94 MMOL/L (ref 99–110)
CHLORIDE BLD-SCNC: 95 MMOL/L (ref 99–110)
CO2: 19 MMOL/L (ref 21–32)
CO2: 20 MMOL/L (ref 21–32)
CREAT SERPL-MCNC: <0.5 MG/DL (ref 0.6–1.2)
CREAT SERPL-MCNC: <0.5 MG/DL (ref 0.6–1.2)
GFR AFRICAN AMERICAN: >60
GFR AFRICAN AMERICAN: >60
GFR NON-AFRICAN AMERICAN: >60
GFR NON-AFRICAN AMERICAN: >60
GLUCOSE BLD-MCNC: 142 MG/DL (ref 70–99)
GLUCOSE BLD-MCNC: 145 MG/DL (ref 70–99)
PHOSPHORUS: 2.1 MG/DL (ref 2.5–4.9)
PHOSPHORUS: 2.5 MG/DL (ref 2.5–4.9)
POTASSIUM SERPL-SCNC: 3.3 MMOL/L (ref 3.5–5.1)
POTASSIUM SERPL-SCNC: 3.6 MMOL/L (ref 3.5–5.1)
SODIUM BLD-SCNC: 127 MMOL/L (ref 136–145)
SODIUM BLD-SCNC: 128 MMOL/L (ref 136–145)

## 2021-08-13 PROCEDURE — 2580000003 HC RX 258: Performed by: INTERNAL MEDICINE

## 2021-08-13 PROCEDURE — 6370000000 HC RX 637 (ALT 250 FOR IP): Performed by: NURSE PRACTITIONER

## 2021-08-13 PROCEDURE — 99239 HOSP IP/OBS DSCHRG MGMT >30: CPT | Performed by: INTERNAL MEDICINE

## 2021-08-13 PROCEDURE — 6360000002 HC RX W HCPCS: Performed by: INTERNAL MEDICINE

## 2021-08-13 PROCEDURE — 36415 COLL VENOUS BLD VENIPUNCTURE: CPT

## 2021-08-13 PROCEDURE — 6370000000 HC RX 637 (ALT 250 FOR IP): Performed by: INTERNAL MEDICINE

## 2021-08-13 PROCEDURE — 80069 RENAL FUNCTION PANEL: CPT

## 2021-08-13 RX ORDER — AMLODIPINE BESYLATE 5 MG/1
5 TABLET ORAL DAILY
Qty: 30 TABLET | Refills: 3 | Status: SHIPPED | OUTPATIENT
Start: 2021-08-14 | End: 2021-08-13

## 2021-08-13 RX ORDER — PANTOPRAZOLE SODIUM 40 MG/1
40 TABLET, DELAYED RELEASE ORAL
Qty: 30 TABLET | Refills: 1 | Status: SHIPPED | OUTPATIENT
Start: 2021-08-14

## 2021-08-13 RX ORDER — PANTOPRAZOLE SODIUM 40 MG/1
40 TABLET, DELAYED RELEASE ORAL
Status: DISCONTINUED | OUTPATIENT
Start: 2021-08-13 | End: 2021-08-13 | Stop reason: HOSPADM

## 2021-08-13 RX ORDER — AMLODIPINE BESYLATE 5 MG/1
5 TABLET ORAL DAILY
Qty: 30 TABLET | Refills: 3 | Status: SHIPPED | OUTPATIENT
Start: 2021-08-14

## 2021-08-13 RX ORDER — PROMETHAZINE HYDROCHLORIDE 25 MG/1
25 TABLET ORAL 4 TIMES DAILY PRN
Qty: 20 TABLET | Refills: 0 | Status: SHIPPED | OUTPATIENT
Start: 2021-08-13 | End: 2021-08-13 | Stop reason: SDUPTHER

## 2021-08-13 RX ORDER — PROMETHAZINE HYDROCHLORIDE 25 MG/1
25 TABLET ORAL 4 TIMES DAILY PRN
Qty: 20 TABLET | Refills: 0 | Status: SHIPPED | OUTPATIENT
Start: 2021-08-13 | End: 2021-08-20

## 2021-08-13 RX ORDER — PROMETHAZINE HYDROCHLORIDE 25 MG/ML
6.25 INJECTION, SOLUTION INTRAMUSCULAR; INTRAVENOUS EVERY 6 HOURS PRN
Status: DISCONTINUED | OUTPATIENT
Start: 2021-08-13 | End: 2021-08-13 | Stop reason: HOSPADM

## 2021-08-13 RX ORDER — CETIRIZINE HYDROCHLORIDE 10 MG/1
10 TABLET ORAL DAILY
Status: DISCONTINUED | OUTPATIENT
Start: 2021-08-13 | End: 2021-08-13 | Stop reason: HOSPADM

## 2021-08-13 RX ORDER — PANTOPRAZOLE SODIUM 40 MG/1
40 TABLET, DELAYED RELEASE ORAL
Qty: 30 TABLET | Refills: 3 | Status: SHIPPED | OUTPATIENT
Start: 2021-08-14 | End: 2021-08-13

## 2021-08-13 RX ADMIN — POTASSIUM BICARBONATE 40 MEQ: 782 TABLET, EFFERVESCENT ORAL at 12:58

## 2021-08-13 RX ADMIN — ENOXAPARIN SODIUM 40 MG: 40 INJECTION SUBCUTANEOUS at 09:32

## 2021-08-13 RX ADMIN — PANTOPRAZOLE SODIUM 40 MG: 40 TABLET, DELAYED RELEASE ORAL at 12:50

## 2021-08-13 RX ADMIN — ACETAMINOPHEN 650 MG: 325 TABLET ORAL at 01:18

## 2021-08-13 RX ADMIN — CALCIUM CARBONATE (ANTACID) CHEW TAB 500 MG 500 MG: 500 CHEW TAB at 07:48

## 2021-08-13 RX ADMIN — LIDOCAINE HYDROCHLORIDE: 20 SOLUTION ORAL; TOPICAL at 12:42

## 2021-08-13 RX ADMIN — PROMETHAZINE HYDROCHLORIDE 6.25 MG: 25 INJECTION INTRAMUSCULAR; INTRAVENOUS at 12:52

## 2021-08-13 RX ADMIN — CETIRIZINE HYDROCHLORIDE 10 MG: 10 TABLET ORAL at 12:50

## 2021-08-13 RX ADMIN — AMLODIPINE BESYLATE 5 MG: 5 TABLET ORAL at 09:32

## 2021-08-13 RX ADMIN — SODIUM CHLORIDE: 9 INJECTION, SOLUTION INTRAVENOUS at 08:02

## 2021-08-13 ASSESSMENT — PAIN SCALES - GENERAL: PAINLEVEL_OUTOF10: 3

## 2021-08-13 NOTE — PROGRESS NOTES
Patient sitting up in chair. States she feel better than when she came in.   VSS as per flowsheet  -  Will update MD

## 2021-08-13 NOTE — DISCHARGE SUMMARY
Name:  Digna Greenfield  Room:  /1367-12  MRN:    [id number]    Discharge Summary      This discharge summary is in conjunction with a complete physical exam done on the day of discharge. Attending Physician: Dr. Cecil Vega  Discharging Physician: Dr. Larue Severin: 8/11/2021  Discharge:   8/13/2021    HPI:  57 y.o. female who presented to the hospital with a chief complaint of headaches and increased fatigue and lethargy. The patient states that she mowed her yard for about 3 to 4 hours today and a temperature as well over 90 degrees. After that she felt really ill, endorsed nausea and headaches. She thinks she may have overdone it. She went in and drank a whole lot of water. She was not able to quantify exactly how much she drank but says it was a lot. Despite all this she continued to feel poorly. With the worsening headaches and sick feeling she decided to come to the emergency department to get evaluated. In the ER here she was found to have a serum sodium level of 120. She was transferred for further medical evaluation and treatment. Diagnoses this Admission and Hospital Course   Hyponatremia likely due to heat exhaustion.  - SIADH ruled out   - Na 120-->121  - Nephrology consulted  - IVF's. Osmolality 380.  - seizure precautions. - Sodium improved to 127. Check BMP in one week. Seen and cleared for discharge by nephrology.     Hypokalemia  - replaced. Improved.      Elevated BP without diagnosis of hypertension  - started on Norvasc. BP improving.  - Hydralazine prn  - Follow closely with PCP.     Anxiety  - on Klonopin at home     Headaches  - possibly due to uncontrolled hypertension, hyponatremia.     Nausea/vomiting  - Zofran  - one time dose of Phenergan. Discharged home with Phenergan. GERD  -Complaint of heartburn. Started on Protonix. I gave her a GI cocktail. Much improved.   Sent home with a prescription for Protonix.     Lovenox for DVT prophylaxis    Procedures (Please Review Full Report for Details)  N/A    Consults    Nephrology       Physical Exam at Discharge:    BP (!) 146/81   Pulse 92   Temp 98.3 °F (36.8 °C) (Oral)   Resp 16   Ht 5' (1.524 m)   Wt 127 lb 6.4 oz (57.8 kg)   SpO2 97%   BMI 24.88 kg/m²     Gen: No distress. Alert. Eyes: PERRL. No sclera icterus. No conjunctival injection. ENT: No discharge. Pharynx clear. Neck: Trachea midline. Resp: No accessory muscle use. No crackles. No wheezes. No rhonchi. CV: Regular rate. Regular rhythm. No murmur. No rub. No edema. Capillary Refill: Brisk,< 3 seconds   Peripheral Pulses: +2 palpable, equal bilaterally   GI: Non-tender. Non-distended. Normal bowel sounds. No hernia. Skin: Warm and dry. No nodule on exposed extremities. No rash on exposed extremities. M/S: No cyanosis. No joint deformity. No clubbing. Neuro: Awake. Grossly nonfocal    Psych: Oriented x 3. No anxiety or agitation      CBC:   Recent Labs     08/11/21 2231   WBC 9.9   HGB 14.7   HCT 41.9   MCV 91.3        BMP:   Recent Labs     08/12/21  2248 08/13/21  0836 08/13/21  1357   * 128* 127*   K 3.6 3.3* 3.6   CL 87* 95* 94*   CO2 21 20* 19*   PHOS 2.7 2.5 2.1*   BUN 4* 4* 4*   CREATININE <0.5* <0.5* <0.5*     LIVER PROFILE:   Recent Labs     08/11/21  2231   AST 23   ALT 16   BILITOT 0.6   ALKPHOS 93     UA:  Recent Labs     08/12/21  0510   COLORU Yellow   PHUR 7.5   WBCUA None seen   RBCUA 0-2   CLARITYU Clear   SPECGRAV 1.015   LEUKOCYTESUR Negative   UROBILINOGEN 0.2   BILIRUBINUR Negative   BLOODU TRACE-INTACT*   GLUCOSEU 250*       CARDIAC ENZYMES  Recent Labs     08/11/21 2231   TROPONINI <0.01       CULTURES  N/A    RADIOLOGY  CT Head WO Contrast   Final Result   1. No acute intracranial abnormality. 2. Air-fluid level in the right maxillary sinus. Correlate with any symptoms   of sinusitis.                Discharge Medications     Medication List      START taking these medications    amLODIPine 5 MG tablet  Commonly known as: NORVASC  Take 1 tablet by mouth daily  Start taking on: August 14, 2021     pantoprazole 40 MG tablet  Commonly known as: PROTONIX  Take 1 tablet by mouth every morning (before breakfast)  Start taking on: August 14, 2021     promethazine 25 MG tablet  Commonly known as: PHENERGAN  Take 1 tablet by mouth 4 times daily as needed for Nausea        CONTINUE taking these medications    acyclovir 200 MG capsule  Commonly known as: ZOVIRAX     azithromycin 250 MG tablet  Commonly known as: ZITHROMAX     butalbital-APAP-caffeine -40 MG Caps per capsule  Commonly known as: Fioricet  Take 1 capsule by mouth every 4 hours as needed for Headaches     Claritin 10 MG capsule  Generic drug: loratadine     Cranberry 500 MG Caps     ondansetron 4 MG tablet  Commonly known as: Zofran  Take 1 tablet by mouth every 8 hours as needed for Nausea     vitamin C 500 MG tablet  Commonly known as: ASCORBIC ACID     vitamin D 1000 UNIT Tabs tablet  Commonly known as: CHOLECALCIFEROL           Where to Get Your Medications      These medications were sent to [address], [address] [address]    Phone: [phone]   · amLODIPine 5 MG tablet  · pantoprazole 40 MG tablet  · promethazine 25 MG tablet           Discharged in stable condition to home. Follow Up: Follow up with PCP.      More than 30 minutes spent      Patience Christensen MD

## 2021-08-13 NOTE — FLOWSHEET NOTE
08/12/21 1956   Vital Signs   Temp 97.5 °F (36.4 °C)   Temp Source Oral   Pulse 98   Heart Rate Source Monitor   Resp 16   /80   BP Location Left upper arm   Level of Consciousness Alert (0)   MEWS Score 1   Oxygen Therapy   SpO2 95 %   O2 Device None (Room air)   Pt assessment complete. Pt lying in bed quietly. No s/s of distress noted. IV fluids infusing at 75ml/hr. Denies needs at this time. Next Renal draw around 11:00pm.  Call light within reach. Will continue to monitor.

## 2021-08-13 NOTE — PROGRESS NOTES
[facility] Progress Note           Subjective/   57y.o. year old female who we are seeing in consultation for Hyponatremia. HPI:  Sodium levels better with IVF's, urine output of 3 liters over last 24 hours. Intake better. ROS:  C/o nausea, headaches. Objective/   GEN:  Chronically ill, BP (!) 149/80   Pulse 93   Temp 97.6 °F (36.4 °C) (Oral)   Resp 18   Ht 5' (1.524 m)   Wt 127 lb 6.4 oz (57.8 kg)   SpO2 97%   BMI 24.88 kg/m²   HEENT: non-icteric, no JVD  CV: S1, S2 without m/r/g; no LE edema  RESP: CTA B without w/r/r; breathing wnl  ABD: +bs, soft, nt, no hsm  SKIN: warm, no rashes    Data/  Recent Labs     08/11/21  2231   WBC 9.9   HGB 14.7   HCT 41.9   MCV 91.3        Recent Labs     08/11/21  2231 08/12/21  0508 08/12/21  1647 08/12/21  2248 08/13/21  0836   *   < > 122* 121* 128*   K 3.4*   < > 3.4* 3.6 3.3*   CL 84*   < > 88* 87* 95*   CO2 21   < > 19* 21 20*   GLUCOSE 133*   < > 153* 139* 142*   PHOS  --    < > 2.8 2.7 2.5   MG 1.70*  --   --   --   --    BUN 4*   < > 5* 4* 4*   CREATININE <0.5*   < > <0.5* <0.5* <0.5*   LABGLOM >60   < > >60 >60 >60   GFRAA >60   < > >60 >60 >60    < > = values in this interval not displayed. Hba1c 5.4    Assessment/     - Hyponatremia - hypovolemic from heat exhaustion - better with IVF's   SNa previously 133 from Dec 2020 from outpatient records     - Hypokalemia - prn replacement     - Hypertension - started on norvasc, BP trend better    Plan/     - Attempt to monitor off of IVF's from here  - Trend labs [id number], bp's, & urine output     Okay for discharge  Repeat BMP in one week with PCP - d/w patient & sister    ____________________________________  Kelli Thornton MD  [facility]  [email or web address]  Office: [phone]

## 2021-08-13 NOTE — PROGRESS NOTES
Pt assisted to bathroom and  Back to bed. Ice pack provided for headache. Jello given per request.  No other needs at this time. Call light within reach.

## 2021-08-13 NOTE — DISCHARGE INSTR - DIET

## 2021-08-19 NOTE — PROGRESS NOTES
Physician Progress Note      PATIENT:               Melina Ennis  CSN #:                  [id number]  :                       1960  ADMIT DATE:       2021 10:14 PM  [address] DATE:        2021 6:53 PM  RESPONDING  PROVIDER #:        Atul Potter MD          QUERY TEXT:    Pt admitted with hyponatremia vs SIADH. If possible, after study, please   document in the progress notes and discharge summary if you are evaluating and   / or treating any of the following: The medical record reflects the following:  Risk Factors: heat exposure, htn  Clinical Indicators: per nephrology:  Hyponatremia - hypovolemic from heat   exhaustion - better with IVF's  Treatment: nephrology consult, IVF, trend labs, bp, and urine output    Thank you for your assistance,  Brianna Ambrocio RN,BSN,CCDS,CRCR  Options provided:  -- SIADH ruled out  -- SIADH confirmed  -- Other - I will add my own diagnosis  -- Disagree - Not applicable / Not valid  -- Disagree - Clinically unable to determine / Unknown  -- Refer to Clinical Documentation Reviewer    PROVIDER RESPONSE TEXT:    SIADH ruled out after study.     Query created by: Alber Villagomez on 2021 8:46 AM      Electronically signed by:  Atul Potter MD 2021 11:01 AM

## 2022-09-12 NOTE — PROGRESS NOTES
Patient resting quietly in bed. No s/s of distress noted. Shift assessment complete, see flow sheet. Feeling nauseated this AM. Call light in reach. Will monitor. sensory intact